# Patient Record
Sex: FEMALE | Race: WHITE | NOT HISPANIC OR LATINO | Employment: OTHER | URBAN - METROPOLITAN AREA
[De-identification: names, ages, dates, MRNs, and addresses within clinical notes are randomized per-mention and may not be internally consistent; named-entity substitution may affect disease eponyms.]

---

## 2024-06-13 ENCOUNTER — TELEMEDICINE (OUTPATIENT)
Dept: PRIMARY CARE | Facility: CLINIC | Age: 84
End: 2024-06-13
Payer: MEDICARE

## 2024-06-13 DIAGNOSIS — E55.9 VITAMIN D DEFICIENCY, UNSPECIFIED: ICD-10-CM

## 2024-06-13 DIAGNOSIS — E78.5 DYSLIPIDEMIA: Primary | ICD-10-CM

## 2024-06-13 DIAGNOSIS — Q78.2 OSTEOPETROSIS (HHS-HCC): ICD-10-CM

## 2024-06-13 DIAGNOSIS — K57.30 DIVERTICULOSIS OF LARGE INTESTINE WITHOUT HEMORRHAGE: ICD-10-CM

## 2024-06-13 DIAGNOSIS — Z85.51 HISTORY OF BLADDER CANCER: ICD-10-CM

## 2024-06-13 PROBLEM — I34.1 MVP (MITRAL VALVE PROLAPSE): Status: ACTIVE | Noted: 2024-06-13

## 2024-06-13 PROBLEM — C67.9 MALIGNANT NEOPLASM OF URINARY BLADDER (MULTI): Status: ACTIVE | Noted: 2017-05-17

## 2024-06-13 PROCEDURE — 99214 OFFICE O/P EST MOD 30 MIN: CPT | Performed by: INTERNAL MEDICINE

## 2024-06-13 ASSESSMENT — ENCOUNTER SYMPTOMS
OCCASIONAL FEELINGS OF UNSTEADINESS: 0
DEPRESSION: 0
LOSS OF SENSATION IN FEET: 0

## 2024-06-13 NOTE — PROGRESS NOTES
Subjective   Frances Lombardo Lee is a 83 y.o. female who presents for here for virtual visit.  ALBERTO Bowers is 83 she is here for a virtual visit she has a known history of irritable bowel syndrome diverticulosis dyslipidemia general anxiety disorder vitamin D insufficiency history of bladder cancer follows with [facility] Dr. Smith patient is here for a follow-up, she denies fever chills cough nausea vomiting constipation diarrhea dysuria urgency or frequency.  Review of Systems    Objective     There were no vitals taken for this visit.   Physical Exam        Assessment/Plan     Here for virtual visit    Will order blood works    Fasting blood works    CBC BMP lipids AST ALT evaluate 25-hydroxy    Continue with the low-fat, low-cholesterol diet,  I recommended Mediterranean diet, which include fish, chicken, vegetables and olive oil  Exercise daily for 30 minutes at least 3 times a week  Continue home medications    History of irritable bowel  Fiber diet and fluid  Doing well no complaints    General anxiety disorder  Doing quite well off medications    Vitamin D insufficiency  Vitamin D3 2000 units daily    History of bladder cancer  Follows with Dr. Smith at the [facility]  CT abdomen June 7, 2020 pelvis no acute process  Will repeat CT abdomen pelvis with IV contrast  Problem List Items Addressed This Visit       Diverticulosis of large intestine without hemorrhage    Relevant Orders    CBC    Dyslipidemia - Primary    Relevant Orders    Basic Metabolic Panel    Lipid Panel    AST    ALT    History of bladder cancer    Osteopetrosis (HHS-HCC)    Relevant Orders    Basic Metabolic Panel    Vitamin D deficiency, unspecified    Relevant Orders    Vitamin D 25-Hydroxy,Total (for eval of Vitamin D levels)         Travis Garcia MD